# Patient Record
Sex: MALE | Race: WHITE | HISPANIC OR LATINO | Employment: FULL TIME | URBAN - METROPOLITAN AREA
[De-identification: names, ages, dates, MRNs, and addresses within clinical notes are randomized per-mention and may not be internally consistent; named-entity substitution may affect disease eponyms.]

---

## 2021-12-23 ENCOUNTER — IMMUNIZATION (OUTPATIENT)
Dept: INTERNAL MEDICINE | Facility: CLINIC | Age: 26
End: 2021-12-23
Payer: OTHER GOVERNMENT

## 2021-12-23 DIAGNOSIS — Z23 NEED FOR VACCINATION: Primary | ICD-10-CM

## 2021-12-23 PROCEDURE — 0004A COVID-19, MRNA, LNP-S, PF, 30 MCG/0.3 ML DOSE VACCINE: CPT | Mod: PBBFAC,CV19

## 2024-04-03 ENCOUNTER — OFFICE VISIT (OUTPATIENT)
Dept: URGENT CARE | Facility: CLINIC | Age: 29
End: 2024-04-03
Payer: COMMERCIAL

## 2024-04-03 VITALS
BODY MASS INDEX: 23.3 KG/M2 | RESPIRATION RATE: 16 BRPM | HEIGHT: 66 IN | DIASTOLIC BLOOD PRESSURE: 75 MMHG | HEART RATE: 73 BPM | TEMPERATURE: 99 F | SYSTOLIC BLOOD PRESSURE: 113 MMHG | OXYGEN SATURATION: 98 % | WEIGHT: 145 LBS

## 2024-04-03 DIAGNOSIS — N30.00 ACUTE CYSTITIS WITHOUT HEMATURIA: Primary | ICD-10-CM

## 2024-04-03 DIAGNOSIS — R10.2 SUPRAPUBIC PRESSURE: ICD-10-CM

## 2024-04-03 DIAGNOSIS — R68.83 CHILLS: ICD-10-CM

## 2024-04-03 LAB
BILIRUB UR QL STRIP: NEGATIVE
CTP QC/QA: YES
GLUCOSE UR QL STRIP: NEGATIVE
KETONES UR QL STRIP: POSITIVE
LEUKOCYTE ESTERASE UR QL STRIP: NEGATIVE
PH, POC UA: 6 (ref 5–8)
POC BLOOD, URINE: POSITIVE
POC MOLECULAR INFLUENZA A AGN: NEGATIVE
POC MOLECULAR INFLUENZA B AGN: NEGATIVE
POC NITRATES, URINE: NEGATIVE
PROT UR QL STRIP: NEGATIVE
SP GR UR STRIP: 1.01 (ref 1–1.03)
UROBILINOGEN UR STRIP-ACNC: ABNORMAL (ref 0.3–2.2)

## 2024-04-03 PROCEDURE — 87502 INFLUENZA DNA AMP PROBE: CPT | Mod: QW,S$GLB,,

## 2024-04-03 PROCEDURE — 81003 URINALYSIS AUTO W/O SCOPE: CPT | Mod: QW,S$GLB,,

## 2024-04-03 PROCEDURE — 99203 OFFICE O/P NEW LOW 30 MIN: CPT | Mod: S$GLB,,,

## 2024-04-03 PROCEDURE — 87086 URINE CULTURE/COLONY COUNT: CPT

## 2024-04-03 RX ORDER — SULFAMETHOXAZOLE AND TRIMETHOPRIM 800; 160 MG/1; MG/1
1 TABLET ORAL 2 TIMES DAILY
Qty: 14 TABLET | Refills: 0 | Status: SHIPPED | OUTPATIENT
Start: 2024-04-03 | End: 2024-04-10

## 2024-04-03 RX ORDER — ETODOLAC 200 MG/1
200 CAPSULE ORAL EVERY 8 HOURS
COMMUNITY
Start: 2023-11-17

## 2024-04-03 NOTE — PROGRESS NOTES
"Subjective:      Patient ID: Bulmaro Ricketts is a 28 y.o. male.    Vitals:  height is 5' 6" (1.676 m) and weight is 65.8 kg (145 lb). His oral temperature is 98.6 °F (37 °C). His blood pressure is 113/75 and his pulse is 73. His respiration is 16 and oxygen saturation is 98%.     Chief Complaint: Abdominal Pain    Pt present with abdominal pain, fatigue, nausea, chills, bladder pain ( started 3 weeks ago). Sx started 3 days ago     Provider note starts below:  Patient presents to clinic for evaluation. He reports bladder pressure and dysuria which onset 3 weeks ago. States the pressure is worse when he urinates and resolves afterwards. He also reports urinary urgency and frequency since then. He has not been evaluated or treated for these symptoms. Approximately 3 days ago, patient started feeling fatigued and reports nausea and chills. He is here today for evaluation of his symptoms, but also requesting flu test today. No known sick contacts. Patient denies fever, flank pain, hematuria, vomiting, diarrhea, constipation, blood in stool, chest pain, shortness of breath. Last BM was today.     Abdominal Pain  This is a new problem. The current episode started in the past 7 days. The onset quality is gradual. The problem occurs constantly. The problem has been gradually worsening. The pain is mild. The quality of the pain is aching. Associated symptoms include dysuria, frequency and nausea. Pertinent negatives include no constipation, diarrhea, fever, headaches, hematochezia, hematuria, myalgias or vomiting. Nothing aggravates the pain. The pain is relieved by Nothing.       Constitution: Positive for chills and fatigue. Negative for activity change, appetite change, fever and generalized weakness.   HENT:  Negative for ear pain, ear discharge, congestion and sore throat.    Neck: Negative for neck pain and neck stiffness.   Cardiovascular:  Negative for chest pain and palpitations.   Eyes:  Negative for eye itching and " eye pain.   Respiratory:  Negative for cough and shortness of breath.    Gastrointestinal:  Positive for abdominal pain (lower) and nausea. Negative for vomiting, constipation, diarrhea and bright red blood in stool.   Genitourinary:  Positive for dysuria, frequency and urgency. Negative for flank pain, hematuria, history of kidney stones, penile discharge, penile pain, penile swelling, scrotal swelling and testicular pain.   Musculoskeletal:  Negative for muscle cramps and muscle ache.   Skin:  Negative for pale and rash.   Neurological:  Negative for dizziness, light-headedness, headaches, disorientation and altered mental status.   Psychiatric/Behavioral:  Negative for altered mental status, disorientation and confusion.       Objective:     Physical Exam   Constitutional: He is oriented to person, place, and time.  Non-toxic appearance. He does not appear ill. No distress.   HENT:   Head: Normocephalic and atraumatic.   Eyes: Conjunctivae are normal. Extraocular movement intact   Neck: Neck supple.   Cardiovascular: Normal rate, regular rhythm, normal heart sounds and normal pulses.   Pulmonary/Chest: Effort normal and breath sounds normal.   Abdominal: Normal appearance. He exhibits no distension and no mass. There is abdominal tenderness (suprapubic). There is no rebound, no guarding, no left CVA tenderness and no right CVA tenderness. No hernia.   Musculoskeletal: Normal range of motion.         General: Normal range of motion.   Neurological: He is alert, oriented to person, place, and time and at baseline.   Skin: Skin is warm and dry.   Psychiatric: His behavior is normal. Mood normal.   Nursing note and vitals reviewed.    Assessment:     Results for orders placed or performed in visit on 04/03/24   POCT Urinalysis, Dipstick, Automated, W/O Scope   Result Value Ref Range    POC Blood, Urine Positive (A) Negative    POC Bilirubin, Urine Negative Negative    POC Urobilinogen, Urine Norm 0.3 - 2.2    POC  Ketones, Urine Positive (A) Negative    POC Protein, Urine Negative Negative    POC Nitrates, Urine Negative Negative    POC Glucose, Urine Negative Negative    pH, UA 6.0 5 - 8    POC Specific Gravity, Urine 1.010 1.003 - 1.029    POC Leukocytes, Urine Negative Negative   POCT Influenza A/B MOLECULAR   Result Value Ref Range    POC Molecular Influenza A Ag Negative Negative, Not Reported    POC Molecular Influenza B Ag Negative Negative, Not Reported     Acceptable Yes        1. Acute cystitis without hematuria    2. Suprapubic pressure    3. Chills        Plan:     Acute cystitis without hematuria  -     sulfamethoxazole-trimethoprim 800-160mg (BACTRIM DS) 800-160 mg Tab; Take 1 tablet by mouth 2 (two) times daily. for 7 days  Dispense: 14 tablet; Refill: 0  -     Ambulatory referral/consult to Internal Medicine    Suprapubic pressure  -     POCT Urinalysis, Dipstick, Automated, W/O Scope  -     CULTURE, URINE    Chills  -     POCT Influenza A/B MOLECULAR      Medical Decision Making:   Urgent Care Management:  Based on pt's symptoms and UA results, will treat as UTI with bactrim. Urine culture sent for further evaluation.    I discussed with patient that evaluation does not suggest any emergent or life threatening medical conditions requiring immediate intervention beyond what was provided, and I believe patient is safe for discharge.  Regardless, an unremarkable evaluation does not preclude the development or presence of a serious of life threatening condition. As such, patient was instructed to go to ED for any new or worsening symptoms.          Patient Instructions   Urinalysis today positive for blood and ketones.   Urine culture was ordered to evaluate for bacteria in the urinary tract. You will get a phone call within 3-5 days regarding urine culture results.  If you were prescribed antibiotics, please take them to completion.  Please drink plenty of fluids.  Please get plenty of rest.  You  can purchase Azo (phenazopyridine 99 mg) over the counter at drug stores.  If you smoke, please stop smoking.  If not allergic, take Tylenol (Acetaminophen) 650 mg to  1 g every 6 hours as needed for fever and/or Motrin (Ibuprofen) 600 to 800 mg every 6 hours as needed for fever as directed for control of pain and/or fever      Please remember that you have received care at an urgent care today. Urgent cares are not emergency rooms and are not equipped to handle life threatening emergencies and cannot rule in or out certain medical conditions and you may be released before all of your medical problems are known or treated. Please arrange follow up with your primary care physician or speciality clinic  within 2-5 days if your signs and symptoms have not resolved or worsen. Patient can call our Referral Hotline at [phone] to make an appointment.    Please return here or go to the Emergency Department for any concerns or worsening of condition.Patient was educated on signs/symptoms that would warrant emergent medical attention.   Signs of infection. These include a fever of 100.4°F (38°C) or higher, chills, back pain, nausea, throwing up, or bloody urine.  Signs come back after treatment ends  You notice more blood in your urine.  Your signs get worse or do not improve within 24 hours of starting treatment.  You are not able to urinate for more than 8 hours.  Your signs come back after treatment has stopped.

## 2024-04-04 NOTE — PATIENT INSTRUCTIONS
Urinalysis today positive for blood and ketones.   Urine culture was ordered to evaluate for bacteria in the urinary tract. You will get a phone call within 3-5 days regarding urine culture results.  If you were prescribed antibiotics, please take them to completion.  Please drink plenty of fluids.  Please get plenty of rest.  You can purchase Azo (phenazopyridine 99 mg) over the counter at drug stores.  If you smoke, please stop smoking.  If not allergic, take Tylenol (Acetaminophen) 650 mg to  1 g every 6 hours as needed for fever and/or Motrin (Ibuprofen) 600 to 800 mg every 6 hours as needed for fever as directed for control of pain and/or fever      Please remember that you have received care at an urgent care today. Urgent cares are not emergency rooms and are not equipped to handle life threatening emergencies and cannot rule in or out certain medical conditions and you may be released before all of your medical problems are known or treated. Please arrange follow up with your primary care physician or speciality clinic  within 2-5 days if your signs and symptoms have not resolved or worsen. Patient can call our Referral Hotline at [phone] to make an appointment.    Please return here or go to the Emergency Department for any concerns or worsening of condition.Patient was educated on signs/symptoms that would warrant emergent medical attention.   Signs of infection. These include a fever of 100.4°F (38°C) or higher, chills, back pain, nausea, throwing up, or bloody urine.  Signs come back after treatment ends  You notice more blood in your urine.  Your signs get worse or do not improve within 24 hours of starting treatment.  You are not able to urinate for more than 8 hours.  Your signs come back after treatment has stopped.

## 2024-04-05 LAB — BACTERIA UR CULT: NORMAL
